# Patient Record
Sex: FEMALE | Race: OTHER | HISPANIC OR LATINO
[De-identification: names, ages, dates, MRNs, and addresses within clinical notes are randomized per-mention and may not be internally consistent; named-entity substitution may affect disease eponyms.]

---

## 2017-01-22 ENCOUNTER — TRANSCRIPTION ENCOUNTER (OUTPATIENT)
Age: 22
End: 2017-01-22

## 2017-05-11 ENCOUNTER — TRANSCRIPTION ENCOUNTER (OUTPATIENT)
Age: 22
End: 2017-05-11

## 2019-01-14 ENCOUNTER — TRANSCRIPTION ENCOUNTER (OUTPATIENT)
Age: 24
End: 2019-01-14

## 2020-01-17 ENCOUNTER — TRANSCRIPTION ENCOUNTER (OUTPATIENT)
Age: 25
End: 2020-01-17

## 2020-02-12 ENCOUNTER — TRANSCRIPTION ENCOUNTER (OUTPATIENT)
Age: 25
End: 2020-02-12

## 2021-04-06 ENCOUNTER — NON-APPOINTMENT (OUTPATIENT)
Age: 26
End: 2021-04-06

## 2021-04-06 ENCOUNTER — APPOINTMENT (OUTPATIENT)
Dept: OBGYN | Facility: CLINIC | Age: 26
End: 2021-04-06
Payer: COMMERCIAL

## 2021-04-06 ENCOUNTER — LABORATORY RESULT (OUTPATIENT)
Age: 26
End: 2021-04-06

## 2021-04-06 VITALS
WEIGHT: 114 LBS | BODY MASS INDEX: 24.59 KG/M2 | SYSTOLIC BLOOD PRESSURE: 90 MMHG | HEIGHT: 57 IN | DIASTOLIC BLOOD PRESSURE: 58 MMHG

## 2021-04-06 DIAGNOSIS — Z34.01 ENCOUNTER FOR SUPERVISION OF NORMAL FIRST PREGNANCY, FIRST TRIMESTER: ICD-10-CM

## 2021-04-06 PROCEDURE — 0500F INITIAL PRENATAL CARE VISIT: CPT

## 2021-04-07 LAB
ABO + RH PNL BLD: NORMAL
ALBUMIN SERPL ELPH-MCNC: 4.4 G/DL
ALP BLD-CCNC: 57 U/L
ALT SERPL-CCNC: 14 U/L
ANION GAP SERPL CALC-SCNC: 13 MMOL/L
AST SERPL-CCNC: 16 U/L
BASOPHILS # BLD AUTO: 0.04 K/UL
BASOPHILS NFR BLD AUTO: 0.4 %
BILIRUB SERPL-MCNC: 0.3 MG/DL
BUN SERPL-MCNC: 9 MG/DL
C TRACH RRNA SPEC QL NAA+PROBE: NOT DETECTED
CALCIUM SERPL-MCNC: 9.8 MG/DL
CHLORIDE SERPL-SCNC: 103 MMOL/L
CO2 SERPL-SCNC: 21 MMOL/L
CREAT SERPL-MCNC: 0.41 MG/DL
EOSINOPHIL # BLD AUTO: 0.26 K/UL
EOSINOPHIL NFR BLD AUTO: 2.7 %
GLUCOSE SERPL-MCNC: 75 MG/DL
HBV SURFACE AG SER QL: NONREACTIVE
HCT VFR BLD CALC: 39.2 %
HGB A MFR BLD: 97.5 %
HGB A2 MFR BLD: 2.5 %
HGB BLD-MCNC: 12.6 G/DL
HGB FRACT BLD-IMP: NORMAL
HPV HIGH+LOW RISK DNA PNL CVX: NOT DETECTED
IMM GRANULOCYTES NFR BLD AUTO: 0.3 %
LYMPHOCYTES # BLD AUTO: 1.7 K/UL
LYMPHOCYTES NFR BLD AUTO: 17.7 %
MAN DIFF?: NORMAL
MCHC RBC-ENTMCNC: 31.2 PG
MCHC RBC-ENTMCNC: 32.1 GM/DL
MCV RBC AUTO: 97 FL
MEV IGG FLD QL IA: 37 AU/ML
MEV IGG+IGM SER-IMP: POSITIVE
MONOCYTES # BLD AUTO: 0.68 K/UL
MONOCYTES NFR BLD AUTO: 7.1 %
N GONORRHOEA RRNA SPEC QL NAA+PROBE: NOT DETECTED
NEUTROPHILS # BLD AUTO: 6.87 K/UL
NEUTROPHILS NFR BLD AUTO: 71.8 %
PLATELET # BLD AUTO: 315 K/UL
POTASSIUM SERPL-SCNC: 3.8 MMOL/L
PROT SERPL-MCNC: 7 G/DL
RBC # BLD: 4.04 M/UL
RBC # FLD: 13.6 %
RUBV IGG FLD-ACNC: 1.3 INDEX
RUBV IGG SER-IMP: POSITIVE
SODIUM SERPL-SCNC: 137 MMOL/L
SOURCE TP AMPLIFICATION: NORMAL
T PALLIDUM AB SER QL IA: NEGATIVE
VZV AB TITR SER: NEGATIVE
VZV IGG SER IF-ACNC: 122.4 INDEX
WBC # FLD AUTO: 9.58 K/UL

## 2021-04-08 LAB
BACTERIA UR CULT: NORMAL
M TB IFN-G BLD-IMP: NEGATIVE
QUANTIFERON TB PLUS MITOGEN MINUS NIL: 7.31 IU/ML
QUANTIFERON TB PLUS NIL: 0.02 IU/ML
QUANTIFERON TB PLUS TB1 MINUS NIL: 0 IU/ML
QUANTIFERON TB PLUS TB2 MINUS NIL: 0 IU/ML

## 2021-04-11 LAB — CYTOLOGY CVX/VAG DOC THIN PREP: NORMAL

## 2021-05-04 ENCOUNTER — APPOINTMENT (OUTPATIENT)
Dept: ANTEPARTUM | Facility: CLINIC | Age: 26
End: 2021-05-04
Payer: COMMERCIAL

## 2021-05-04 ENCOUNTER — APPOINTMENT (OUTPATIENT)
Dept: OBGYN | Facility: CLINIC | Age: 26
End: 2021-05-04
Payer: COMMERCIAL

## 2021-05-04 ENCOUNTER — NON-APPOINTMENT (OUTPATIENT)
Age: 26
End: 2021-05-04

## 2021-05-04 ENCOUNTER — ASOB RESULT (OUTPATIENT)
Age: 26
End: 2021-05-04

## 2021-05-04 VITALS
HEIGHT: 57 IN | BODY MASS INDEX: 25.67 KG/M2 | HEART RATE: 87 BPM | DIASTOLIC BLOOD PRESSURE: 61 MMHG | WEIGHT: 119 LBS | SYSTOLIC BLOOD PRESSURE: 94 MMHG

## 2021-05-04 VITALS
BODY MASS INDEX: 25.67 KG/M2 | HEIGHT: 57 IN | WEIGHT: 119 LBS | SYSTOLIC BLOOD PRESSURE: 94 MMHG | DIASTOLIC BLOOD PRESSURE: 56 MMHG

## 2021-05-04 DIAGNOSIS — Z87.59 PERSONAL HISTORY OF OTHER COMPLICATIONS OF PREGNANCY, CHILDBIRTH AND THE PUERPERIUM: ICD-10-CM

## 2021-05-04 DIAGNOSIS — Z87.19 PERSONAL HISTORY OF OTHER COMPLICATIONS OF PREGNANCY, CHILDBIRTH AND THE PUERPERIUM: ICD-10-CM

## 2021-05-04 PROCEDURE — 99072 ADDL SUPL MATRL&STAF TM PHE: CPT

## 2021-05-04 PROCEDURE — 99205 OFFICE O/P NEW HI 60 MIN: CPT | Mod: 25

## 2021-05-04 PROCEDURE — 76813 OB US NUCHAL MEAS 1 GEST: CPT | Mod: 59

## 2021-05-04 PROCEDURE — 76801 OB US < 14 WKS SINGLE FETUS: CPT

## 2021-05-04 PROCEDURE — 0502F SUBSEQUENT PRENATAL CARE: CPT

## 2021-05-04 RX ORDER — PYRIDOXINE HCL (VITAMIN B6) 25 MG
25 TABLET ORAL EVERY 8 HOURS
Qty: 45 | Refills: 0 | Status: ACTIVE | COMMUNITY
Start: 2021-05-04 | End: 1900-01-01

## 2021-05-05 LAB
HCV AB SER QL: NONREACTIVE
HCV S/CO RATIO: 0.13 S/CO

## 2021-05-06 ENCOUNTER — APPOINTMENT (OUTPATIENT)
Dept: ANTEPARTUM | Facility: CLINIC | Age: 26
End: 2021-05-06

## 2021-05-06 ENCOUNTER — ASOB RESULT (OUTPATIENT)
Age: 26
End: 2021-05-06

## 2021-05-12 ENCOUNTER — TRANSCRIPTION ENCOUNTER (OUTPATIENT)
Age: 26
End: 2021-05-12

## 2021-05-13 ENCOUNTER — NON-APPOINTMENT (OUTPATIENT)
Age: 26
End: 2021-05-13

## 2021-05-18 LAB
CLARIM 15Q11.2: NORMAL
CLARIM 1P36: NORMAL
CLARIM 22Q11.2: NORMAL
CLARIM 4P-/WOLF-HIRSCHHORN: NORMAL
CLARIM 5P-/CRI DU CHAT: NORMAL
CLARIM ADDITIONAL INFO: NORMAL
CLARIM CHROMOSOME 13: NORMAL
CLARIM CHROMOSOME 18: NORMAL
CLARIM CHROMOSOME 21: NORMAL
CLARIM SEX CHROMOSOMES: NORMAL
CLARITEST NIPT W/MICRO: NORMAL

## 2021-06-01 ENCOUNTER — NON-APPOINTMENT (OUTPATIENT)
Age: 26
End: 2021-06-01

## 2021-06-01 ENCOUNTER — APPOINTMENT (OUTPATIENT)
Dept: OBGYN | Facility: CLINIC | Age: 26
End: 2021-06-01
Payer: COMMERCIAL

## 2021-06-01 VITALS
DIASTOLIC BLOOD PRESSURE: 69 MMHG | SYSTOLIC BLOOD PRESSURE: 106 MMHG | HEIGHT: 57 IN | WEIGHT: 120 LBS | BODY MASS INDEX: 25.89 KG/M2

## 2021-06-01 DIAGNOSIS — Z34.92 ENCOUNTER FOR SUPERVISION OF NORMAL PREGNANCY, UNSPECIFIED, SECOND TRIMESTER: ICD-10-CM

## 2021-06-01 PROCEDURE — 0502F SUBSEQUENT PRENATAL CARE: CPT | Mod: 25

## 2021-06-02 ENCOUNTER — ASOB RESULT (OUTPATIENT)
Age: 26
End: 2021-06-02

## 2021-06-02 ENCOUNTER — APPOINTMENT (OUTPATIENT)
Dept: ANTEPARTUM | Facility: CLINIC | Age: 26
End: 2021-06-02
Payer: COMMERCIAL

## 2021-06-02 PROCEDURE — 76805 OB US >/= 14 WKS SNGL FETUS: CPT

## 2021-06-02 PROCEDURE — 99072 ADDL SUPL MATRL&STAF TM PHE: CPT

## 2021-06-02 PROCEDURE — 76817 TRANSVAGINAL US OBSTETRIC: CPT

## 2021-06-04 LAB
2ND TRIMESTER DATA: NORMAL
AFP PNL SERPL: NORMAL
AFP SERPL-ACNC: NORMAL
CLINICAL BIOCHEMIST REVIEW: NORMAL
NOTES NTD: NORMAL

## 2021-06-17 ENCOUNTER — ASOB RESULT (OUTPATIENT)
Age: 26
End: 2021-06-17

## 2021-06-17 ENCOUNTER — APPOINTMENT (OUTPATIENT)
Dept: ANTEPARTUM | Facility: CLINIC | Age: 26
End: 2021-06-17
Payer: COMMERCIAL

## 2021-06-17 PROCEDURE — 76815 OB US LIMITED FETUS(S): CPT

## 2021-06-17 PROCEDURE — 99072 ADDL SUPL MATRL&STAF TM PHE: CPT

## 2021-06-17 PROCEDURE — 76817 TRANSVAGINAL US OBSTETRIC: CPT

## 2021-06-29 ENCOUNTER — ASOB RESULT (OUTPATIENT)
Age: 26
End: 2021-06-29

## 2021-06-29 ENCOUNTER — NON-APPOINTMENT (OUTPATIENT)
Age: 26
End: 2021-06-29

## 2021-06-29 ENCOUNTER — APPOINTMENT (OUTPATIENT)
Dept: ANTEPARTUM | Facility: CLINIC | Age: 26
End: 2021-06-29
Payer: COMMERCIAL

## 2021-06-29 ENCOUNTER — APPOINTMENT (OUTPATIENT)
Dept: OBGYN | Facility: CLINIC | Age: 26
End: 2021-06-29
Payer: COMMERCIAL

## 2021-06-29 VITALS
HEART RATE: 71 BPM | OXYGEN SATURATION: 97 % | WEIGHT: 122 LBS | BODY MASS INDEX: 26.32 KG/M2 | DIASTOLIC BLOOD PRESSURE: 52 MMHG | SYSTOLIC BLOOD PRESSURE: 78 MMHG | HEIGHT: 57 IN | TEMPERATURE: 97.9 F

## 2021-06-29 PROCEDURE — 76811 OB US DETAILED SNGL FETUS: CPT

## 2021-06-29 PROCEDURE — 0502F SUBSEQUENT PRENATAL CARE: CPT

## 2021-06-29 PROCEDURE — 99072 ADDL SUPL MATRL&STAF TM PHE: CPT

## 2021-07-27 ENCOUNTER — NON-APPOINTMENT (OUTPATIENT)
Age: 26
End: 2021-07-27

## 2021-07-27 ENCOUNTER — APPOINTMENT (OUTPATIENT)
Dept: OBGYN | Facility: CLINIC | Age: 26
End: 2021-07-27
Payer: COMMERCIAL

## 2021-07-27 VITALS
HEART RATE: 101 BPM | DIASTOLIC BLOOD PRESSURE: 62 MMHG | SYSTOLIC BLOOD PRESSURE: 100 MMHG | HEIGHT: 57 IN | BODY MASS INDEX: 27.18 KG/M2 | OXYGEN SATURATION: 98 % | TEMPERATURE: 97.3 F | WEIGHT: 126 LBS

## 2021-07-27 PROCEDURE — 0502F SUBSEQUENT PRENATAL CARE: CPT

## 2021-07-29 LAB
BASOPHILS # BLD AUTO: 0.03 K/UL
BASOPHILS NFR BLD AUTO: 0.3 %
EOSINOPHIL # BLD AUTO: 0.14 K/UL
EOSINOPHIL NFR BLD AUTO: 1.3 %
GLUCOSE 1H P 50 G GLC PO SERPL-MCNC: 80 MG/DL
HCT VFR BLD CALC: 35.6 %
HGB BLD-MCNC: 10.6 G/DL
IMM GRANULOCYTES NFR BLD AUTO: 1.6 %
LYMPHOCYTES # BLD AUTO: 1.41 K/UL
LYMPHOCYTES NFR BLD AUTO: 13 %
MAN DIFF?: NORMAL
MCHC RBC-ENTMCNC: 29.8 GM/DL
MCHC RBC-ENTMCNC: 30.2 PG
MCV RBC AUTO: 101.4 FL
MONOCYTES # BLD AUTO: 1 K/UL
MONOCYTES NFR BLD AUTO: 9.2 %
NEUTROPHILS # BLD AUTO: 8.13 K/UL
NEUTROPHILS NFR BLD AUTO: 74.6 %
PLATELET # BLD AUTO: 317 K/UL
RBC # BLD: 3.51 M/UL
RBC # FLD: 13.5 %
T PALLIDUM AB SER QL IA: NEGATIVE
WBC # FLD AUTO: 10.88 K/UL

## 2021-08-24 ENCOUNTER — APPOINTMENT (OUTPATIENT)
Dept: OBGYN | Facility: CLINIC | Age: 26
End: 2021-08-24
Payer: COMMERCIAL

## 2021-08-24 VITALS
TEMPERATURE: 97.2 F | DIASTOLIC BLOOD PRESSURE: 60 MMHG | HEART RATE: 104 BPM | HEIGHT: 57 IN | WEIGHT: 131 LBS | SYSTOLIC BLOOD PRESSURE: 89 MMHG | BODY MASS INDEX: 28.26 KG/M2 | OXYGEN SATURATION: 98 %

## 2021-08-24 PROCEDURE — 0502F SUBSEQUENT PRENATAL CARE: CPT

## 2021-08-24 RX ORDER — CHLORHEXIDINE GLUCONATE 4 %
325 (65 FE) LIQUID (ML) TOPICAL DAILY
Qty: 30 | Refills: 6 | Status: ACTIVE | COMMUNITY
Start: 2021-08-24 | End: 1900-01-01

## 2021-09-14 ENCOUNTER — APPOINTMENT (OUTPATIENT)
Dept: OBGYN | Facility: CLINIC | Age: 26
End: 2021-09-14

## 2021-09-14 ENCOUNTER — LABORATORY RESULT (OUTPATIENT)
Age: 26
End: 2021-09-14

## 2021-09-14 VITALS
DIASTOLIC BLOOD PRESSURE: 57 MMHG | BODY MASS INDEX: 28.26 KG/M2 | TEMPERATURE: 97.7 F | OXYGEN SATURATION: 98 % | HEART RATE: 86 BPM | SYSTOLIC BLOOD PRESSURE: 94 MMHG | WEIGHT: 131 LBS | HEIGHT: 57 IN

## 2021-09-14 DIAGNOSIS — Z34.93 ENCOUNTER FOR SUPERVISION OF NORMAL PREGNANCY, UNSPECIFIED, THIRD TRIMESTER: ICD-10-CM

## 2021-09-15 LAB
BASOPHILS # BLD AUTO: 0.03 K/UL
BASOPHILS NFR BLD AUTO: 0.3 %
EOSINOPHIL # BLD AUTO: 0.15 K/UL
EOSINOPHIL NFR BLD AUTO: 1.6 %
HCT VFR BLD CALC: 36.6 %
HGB BLD-MCNC: 10.6 G/DL
IMM GRANULOCYTES NFR BLD AUTO: 1.5 %
LYMPHOCYTES # BLD AUTO: 1.43 K/UL
LYMPHOCYTES NFR BLD AUTO: 15.6 %
MAN DIFF?: NORMAL
MCHC RBC-ENTMCNC: 28.1 PG
MCHC RBC-ENTMCNC: 29 GM/DL
MCV RBC AUTO: 97.1 FL
MONOCYTES # BLD AUTO: 0.77 K/UL
MONOCYTES NFR BLD AUTO: 8.4 %
NEUTROPHILS # BLD AUTO: 6.66 K/UL
NEUTROPHILS NFR BLD AUTO: 72.6 %
PLATELET # BLD AUTO: 282 K/UL
RBC # BLD: 3.77 M/UL
RBC # FLD: 17.4 %
WBC # FLD AUTO: 9.18 K/UL

## 2021-09-21 ENCOUNTER — APPOINTMENT (OUTPATIENT)
Dept: ANTEPARTUM | Facility: CLINIC | Age: 26
End: 2021-09-21
Payer: COMMERCIAL

## 2021-09-21 ENCOUNTER — ASOB RESULT (OUTPATIENT)
Age: 26
End: 2021-09-21

## 2021-09-21 PROCEDURE — 76816 OB US FOLLOW-UP PER FETUS: CPT

## 2021-09-28 ENCOUNTER — APPOINTMENT (OUTPATIENT)
Dept: OBGYN | Facility: CLINIC | Age: 26
End: 2021-09-28
Payer: COMMERCIAL

## 2021-09-28 VITALS
HEART RATE: 82 BPM | HEIGHT: 57 IN | SYSTOLIC BLOOD PRESSURE: 89 MMHG | WEIGHT: 132 LBS | BODY MASS INDEX: 28.48 KG/M2 | OXYGEN SATURATION: 98 % | DIASTOLIC BLOOD PRESSURE: 54 MMHG | TEMPERATURE: 97.7 F

## 2021-09-28 PROCEDURE — 0502F SUBSEQUENT PRENATAL CARE: CPT

## 2021-10-12 ENCOUNTER — APPOINTMENT (OUTPATIENT)
Dept: OBGYN | Facility: CLINIC | Age: 26
End: 2021-10-12
Payer: COMMERCIAL

## 2021-10-12 ENCOUNTER — LABORATORY RESULT (OUTPATIENT)
Age: 26
End: 2021-10-12

## 2021-10-12 VITALS
OXYGEN SATURATION: 98 % | SYSTOLIC BLOOD PRESSURE: 97 MMHG | TEMPERATURE: 97.5 F | WEIGHT: 134 LBS | DIASTOLIC BLOOD PRESSURE: 57 MMHG | HEIGHT: 57 IN | HEART RATE: 68 BPM | BODY MASS INDEX: 28.91 KG/M2

## 2021-10-12 PROCEDURE — 0502F SUBSEQUENT PRENATAL CARE: CPT

## 2021-10-19 ENCOUNTER — APPOINTMENT (OUTPATIENT)
Dept: ANTEPARTUM | Facility: CLINIC | Age: 26
End: 2021-10-19
Payer: COMMERCIAL

## 2021-10-19 ENCOUNTER — ASOB RESULT (OUTPATIENT)
Age: 26
End: 2021-10-19

## 2021-10-19 ENCOUNTER — APPOINTMENT (OUTPATIENT)
Dept: OBGYN | Facility: CLINIC | Age: 26
End: 2021-10-19

## 2021-10-19 VITALS
HEIGHT: 57 IN | TEMPERATURE: 97.7 F | OXYGEN SATURATION: 98 % | BODY MASS INDEX: 29.34 KG/M2 | DIASTOLIC BLOOD PRESSURE: 58 MMHG | WEIGHT: 136 LBS | SYSTOLIC BLOOD PRESSURE: 99 MMHG

## 2021-10-19 PROCEDURE — 76816 OB US FOLLOW-UP PER FETUS: CPT

## 2021-10-26 ENCOUNTER — APPOINTMENT (OUTPATIENT)
Dept: OBGYN | Facility: CLINIC | Age: 26
End: 2021-10-26
Payer: COMMERCIAL

## 2021-10-26 VITALS
WEIGHT: 136 LBS | TEMPERATURE: 97.9 F | DIASTOLIC BLOOD PRESSURE: 60 MMHG | BODY MASS INDEX: 29.34 KG/M2 | OXYGEN SATURATION: 98 % | HEART RATE: 68 BPM | SYSTOLIC BLOOD PRESSURE: 95 MMHG | HEIGHT: 57 IN

## 2021-10-26 PROCEDURE — 0502F SUBSEQUENT PRENATAL CARE: CPT

## 2021-11-01 ENCOUNTER — APPOINTMENT (OUTPATIENT)
Dept: OBGYN | Facility: CLINIC | Age: 26
End: 2021-11-01
Payer: COMMERCIAL

## 2021-11-01 VITALS
HEIGHT: 57 IN | OXYGEN SATURATION: 98 % | DIASTOLIC BLOOD PRESSURE: 52 MMHG | TEMPERATURE: 97.5 F | WEIGHT: 140 LBS | BODY MASS INDEX: 30.2 KG/M2 | SYSTOLIC BLOOD PRESSURE: 92 MMHG | HEART RATE: 94 BPM

## 2021-11-01 VITALS
DIASTOLIC BLOOD PRESSURE: 52 MMHG | TEMPERATURE: 97.5 F | SYSTOLIC BLOOD PRESSURE: 92 MMHG | HEIGHT: 57 IN | BODY MASS INDEX: 30.2 KG/M2 | HEART RATE: 94 BPM | WEIGHT: 140 LBS | OXYGEN SATURATION: 98 %

## 2021-11-01 PROCEDURE — 0502F SUBSEQUENT PRENATAL CARE: CPT

## 2021-11-04 ENCOUNTER — INPATIENT (INPATIENT)
Facility: HOSPITAL | Age: 26
LOS: 1 days | Discharge: HOME | End: 2021-11-06
Attending: OBSTETRICS & GYNECOLOGY | Admitting: OBSTETRICS & GYNECOLOGY
Payer: COMMERCIAL

## 2021-11-04 VITALS — WEIGHT: 136.91 LBS | HEIGHT: 56 IN

## 2021-11-04 DIAGNOSIS — Z34.80 ENCOUNTER FOR SUPERVISION OF OTHER NORMAL PREGNANCY, UNSPECIFIED TRIMESTER: ICD-10-CM

## 2021-11-04 DIAGNOSIS — O26.899 OTHER SPECIFIED PREGNANCY RELATED CONDITIONS, UNSPECIFIED TRIMESTER: ICD-10-CM

## 2021-11-04 DIAGNOSIS — Z3A.00 WEEKS OF GESTATION OF PREGNANCY NOT SPECIFIED: ICD-10-CM

## 2021-11-04 LAB
APTT BLD: 26.2 SEC — LOW (ref 27.5–35.5)
BASOPHILS # BLD AUTO: 0.01 K/UL — SIGNIFICANT CHANGE UP (ref 0–0.2)
BASOPHILS NFR BLD AUTO: 0.1 % — SIGNIFICANT CHANGE UP (ref 0–2)
BLD GP AB SCN SERPL QL: SIGNIFICANT CHANGE UP
EOSINOPHIL # BLD AUTO: 0.03 K/UL — SIGNIFICANT CHANGE UP (ref 0–0.5)
EOSINOPHIL NFR BLD AUTO: 0.4 % — SIGNIFICANT CHANGE UP (ref 0–6)
FIBRINOGEN PPP-MCNC: 667 MG/DL — HIGH (ref 290–520)
HCT VFR BLD CALC: 37.1 % — SIGNIFICANT CHANGE UP (ref 34.5–45)
HGB BLD-MCNC: 12 G/DL — SIGNIFICANT CHANGE UP (ref 11.5–15.5)
IMM GRANULOCYTES NFR BLD AUTO: 0.4 % — SIGNIFICANT CHANGE UP (ref 0–1.5)
INR BLD: 0.99 RATIO — SIGNIFICANT CHANGE UP (ref 0.88–1.16)
LYMPHOCYTES # BLD AUTO: 1.22 K/UL — SIGNIFICANT CHANGE UP (ref 1–3.3)
LYMPHOCYTES # BLD AUTO: 14.5 % — SIGNIFICANT CHANGE UP (ref 13–44)
MCHC RBC-ENTMCNC: 27.7 PG — SIGNIFICANT CHANGE UP (ref 27–34)
MCHC RBC-ENTMCNC: 32.3 GM/DL — SIGNIFICANT CHANGE UP (ref 32–36)
MCV RBC AUTO: 85.7 FL — SIGNIFICANT CHANGE UP (ref 80–100)
MONOCYTES # BLD AUTO: 0.45 K/UL — SIGNIFICANT CHANGE UP (ref 0–0.9)
MONOCYTES NFR BLD AUTO: 5.3 % — SIGNIFICANT CHANGE UP (ref 2–14)
NEUTROPHILS # BLD AUTO: 6.69 K/UL — SIGNIFICANT CHANGE UP (ref 1.8–7.4)
NEUTROPHILS NFR BLD AUTO: 79.3 % — HIGH (ref 43–77)
NRBC # BLD: 0 /100 WBCS — SIGNIFICANT CHANGE UP (ref 0–0)
PLATELET # BLD AUTO: 293 K/UL — SIGNIFICANT CHANGE UP (ref 150–400)
PROTHROM AB SERPL-ACNC: 11.8 SEC — SIGNIFICANT CHANGE UP (ref 10.6–13.6)
RBC # BLD: 4.33 M/UL — SIGNIFICANT CHANGE UP (ref 3.8–5.2)
RBC # FLD: 17.6 % — HIGH (ref 10.3–14.5)
SARS-COV-2 RNA SPEC QL NAA+PROBE: SIGNIFICANT CHANGE UP
WBC # BLD: 8.43 K/UL — SIGNIFICANT CHANGE UP (ref 3.8–10.5)
WBC # FLD AUTO: 8.43 K/UL — SIGNIFICANT CHANGE UP (ref 3.8–10.5)

## 2021-11-04 PROCEDURE — 59400 OBSTETRICAL CARE: CPT | Mod: U9

## 2021-11-04 RX ORDER — PRAMOXINE HYDROCHLORIDE 150 MG/15G
1 AEROSOL, FOAM RECTAL EVERY 4 HOURS
Refills: 0 | Status: DISCONTINUED | OUTPATIENT
Start: 2021-11-04 | End: 2021-11-06

## 2021-11-04 RX ORDER — OXYCODONE HYDROCHLORIDE 5 MG/1
5 TABLET ORAL EVERY 6 HOURS
Refills: 0 | Status: DISCONTINUED | OUTPATIENT
Start: 2021-11-04 | End: 2021-11-06

## 2021-11-04 RX ORDER — DIBUCAINE 1 %
1 OINTMENT (GRAM) RECTAL EVERY 6 HOURS
Refills: 0 | Status: DISCONTINUED | OUTPATIENT
Start: 2021-11-04 | End: 2021-11-06

## 2021-11-04 RX ORDER — CITRIC ACID/SODIUM CITRATE 300-500 MG
15 SOLUTION, ORAL ORAL EVERY 6 HOURS
Refills: 0 | Status: DISCONTINUED | OUTPATIENT
Start: 2021-11-04 | End: 2021-11-04

## 2021-11-04 RX ORDER — BENZOCAINE 10 %
1 GEL (GRAM) MUCOUS MEMBRANE EVERY 6 HOURS
Refills: 0 | Status: DISCONTINUED | OUTPATIENT
Start: 2021-11-04 | End: 2021-11-06

## 2021-11-04 RX ORDER — SODIUM CHLORIDE 9 MG/ML
3 INJECTION INTRAMUSCULAR; INTRAVENOUS; SUBCUTANEOUS EVERY 8 HOURS
Refills: 0 | Status: DISCONTINUED | OUTPATIENT
Start: 2021-11-04 | End: 2021-11-06

## 2021-11-04 RX ORDER — SIMETHICONE 80 MG/1
80 TABLET, CHEWABLE ORAL EVERY 4 HOURS
Refills: 0 | Status: DISCONTINUED | OUTPATIENT
Start: 2021-11-04 | End: 2021-11-06

## 2021-11-04 RX ORDER — LANOLIN
1 OINTMENT (GRAM) TOPICAL EVERY 6 HOURS
Refills: 0 | Status: DISCONTINUED | OUTPATIENT
Start: 2021-11-04 | End: 2021-11-06

## 2021-11-04 RX ORDER — INFLUENZA VIRUS VACCINE 15; 15; 15; 15 UG/.5ML; UG/.5ML; UG/.5ML; UG/.5ML
0.5 SUSPENSION INTRAMUSCULAR ONCE
Refills: 0 | Status: DISCONTINUED | OUTPATIENT
Start: 2021-11-04 | End: 2021-11-06

## 2021-11-04 RX ORDER — IBUPROFEN 200 MG
600 TABLET ORAL EVERY 6 HOURS
Refills: 0 | Status: DISCONTINUED | OUTPATIENT
Start: 2021-11-04 | End: 2021-11-06

## 2021-11-04 RX ORDER — OXYTOCIN 10 UNIT/ML
333.33 VIAL (ML) INJECTION
Qty: 20 | Refills: 0 | Status: DISCONTINUED | OUTPATIENT
Start: 2021-11-04 | End: 2021-11-06

## 2021-11-04 RX ORDER — AER TRAVELER 0.5 G/1
1 SOLUTION RECTAL; TOPICAL EVERY 4 HOURS
Refills: 0 | Status: DISCONTINUED | OUTPATIENT
Start: 2021-11-04 | End: 2021-11-06

## 2021-11-04 RX ORDER — MAGNESIUM HYDROXIDE 400 MG/1
30 TABLET, CHEWABLE ORAL
Refills: 0 | Status: DISCONTINUED | OUTPATIENT
Start: 2021-11-04 | End: 2021-11-06

## 2021-11-04 RX ORDER — TETANUS TOXOID, REDUCED DIPHTHERIA TOXOID AND ACELLULAR PERTUSSIS VACCINE, ADSORBED 5; 2.5; 8; 8; 2.5 [IU]/.5ML; [IU]/.5ML; UG/.5ML; UG/.5ML; UG/.5ML
0.5 SUSPENSION INTRAMUSCULAR ONCE
Refills: 0 | Status: DISCONTINUED | OUTPATIENT
Start: 2021-11-04 | End: 2021-11-06

## 2021-11-04 RX ORDER — OXYTOCIN 10 UNIT/ML
VIAL (ML) INJECTION
Qty: 30 | Refills: 0 | Status: DISCONTINUED | OUTPATIENT
Start: 2021-11-04 | End: 2021-11-04

## 2021-11-04 RX ORDER — SODIUM CHLORIDE 9 MG/ML
1000 INJECTION, SOLUTION INTRAVENOUS ONCE
Refills: 0 | Status: DISCONTINUED | OUTPATIENT
Start: 2021-11-04 | End: 2021-11-04

## 2021-11-04 RX ORDER — ACETAMINOPHEN 500 MG
650 TABLET ORAL EVERY 6 HOURS
Refills: 0 | Status: DISCONTINUED | OUTPATIENT
Start: 2021-11-04 | End: 2021-11-06

## 2021-11-04 RX ORDER — HYDROCORTISONE 1 %
1 OINTMENT (GRAM) TOPICAL EVERY 6 HOURS
Refills: 0 | Status: DISCONTINUED | OUTPATIENT
Start: 2021-11-04 | End: 2021-11-06

## 2021-11-04 RX ORDER — SODIUM CHLORIDE 9 MG/ML
1000 INJECTION, SOLUTION INTRAVENOUS
Refills: 0 | Status: DISCONTINUED | OUTPATIENT
Start: 2021-11-04 | End: 2021-11-04

## 2021-11-04 RX ORDER — DIPHENHYDRAMINE HCL 50 MG
25 CAPSULE ORAL EVERY 6 HOURS
Refills: 0 | Status: DISCONTINUED | OUTPATIENT
Start: 2021-11-04 | End: 2021-11-06

## 2021-11-04 RX ADMIN — Medication 1000 MILLIUNIT(S)/MIN: at 22:18

## 2021-11-04 RX ADMIN — Medication 6 MILLIUNIT(S)/MIN: at 18:13

## 2021-11-04 RX ADMIN — SODIUM CHLORIDE 125 MILLILITER(S): 9 INJECTION, SOLUTION INTRAVENOUS at 17:03

## 2021-11-05 LAB
BASOPHILS # BLD AUTO: 0.03 K/UL — SIGNIFICANT CHANGE UP (ref 0–0.2)
BASOPHILS NFR BLD AUTO: 0.2 % — SIGNIFICANT CHANGE UP (ref 0–2)
EOSINOPHIL # BLD AUTO: 0.06 K/UL — SIGNIFICANT CHANGE UP (ref 0–0.5)
EOSINOPHIL NFR BLD AUTO: 0.4 % — SIGNIFICANT CHANGE UP (ref 0–6)
HCT VFR BLD CALC: 28.5 % — LOW (ref 34.5–45)
HGB BLD-MCNC: 9.2 G/DL — LOW (ref 11.5–15.5)
IMM GRANULOCYTES NFR BLD AUTO: 0.4 % — SIGNIFICANT CHANGE UP (ref 0–1.5)
LYMPHOCYTES # BLD AUTO: 14.5 % — SIGNIFICANT CHANGE UP (ref 13–44)
LYMPHOCYTES # BLD AUTO: 2.11 K/UL — SIGNIFICANT CHANGE UP (ref 1–3.3)
MCHC RBC-ENTMCNC: 27.8 PG — SIGNIFICANT CHANGE UP (ref 27–34)
MCHC RBC-ENTMCNC: 32.3 GM/DL — SIGNIFICANT CHANGE UP (ref 32–36)
MCV RBC AUTO: 86.1 FL — SIGNIFICANT CHANGE UP (ref 80–100)
MONOCYTES # BLD AUTO: 1.03 K/UL — HIGH (ref 0–0.9)
MONOCYTES NFR BLD AUTO: 7.1 % — SIGNIFICANT CHANGE UP (ref 2–14)
NEUTROPHILS # BLD AUTO: 11.23 K/UL — HIGH (ref 1.8–7.4)
NEUTROPHILS NFR BLD AUTO: 77.4 % — HIGH (ref 43–77)
NRBC # BLD: 0 /100 WBCS — SIGNIFICANT CHANGE UP (ref 0–0)
PLATELET # BLD AUTO: 226 K/UL — SIGNIFICANT CHANGE UP (ref 150–400)
RBC # BLD: 3.31 M/UL — LOW (ref 3.8–5.2)
RBC # FLD: 17.8 % — HIGH (ref 10.3–14.5)
T PALLIDUM AB TITR SER: NEGATIVE — SIGNIFICANT CHANGE UP
WBC # BLD: 14.52 K/UL — HIGH (ref 3.8–10.5)
WBC # FLD AUTO: 14.52 K/UL — HIGH (ref 3.8–10.5)

## 2021-11-05 RX ADMIN — Medication 600 MILLIGRAM(S): at 12:07

## 2021-11-05 RX ADMIN — SODIUM CHLORIDE 3 MILLILITER(S): 9 INJECTION INTRAMUSCULAR; INTRAVENOUS; SUBCUTANEOUS at 14:08

## 2021-11-05 RX ADMIN — Medication 600 MILLIGRAM(S): at 11:53

## 2021-11-05 RX ADMIN — Medication 600 MILLIGRAM(S): at 18:26

## 2021-11-05 RX ADMIN — Medication 600 MILLIGRAM(S): at 18:04

## 2021-11-05 RX ADMIN — Medication 1 TABLET(S): at 11:53

## 2021-11-05 RX ADMIN — SODIUM CHLORIDE 3 MILLILITER(S): 9 INJECTION INTRAMUSCULAR; INTRAVENOUS; SUBCUTANEOUS at 06:21

## 2021-11-05 NOTE — LACTATION INITIAL EVALUATION - LACTATION INTERVENTIONS
Breastfeeding on cue 8-12X/24 hours with diaper count to assess for adequate intake, safe skin to skin and rooming-in encouraged./reviewed components of an effective feeding and at least 8 effective feedings per day required/reviewed importance of monitoring infant diapers, the breastfeeding log, and minimum output each day/reviewed risks of unnecessary formula supplementation/reviewed benefits and recommendations for rooming in/reviewed feeding on demand/by cue at least 8 times a day

## 2021-11-06 ENCOUNTER — TRANSCRIPTION ENCOUNTER (OUTPATIENT)
Age: 26
End: 2021-11-06

## 2021-11-06 VITALS
HEART RATE: 75 BPM | TEMPERATURE: 98 F | SYSTOLIC BLOOD PRESSURE: 90 MMHG | OXYGEN SATURATION: 99 % | DIASTOLIC BLOOD PRESSURE: 58 MMHG | RESPIRATION RATE: 18 BRPM

## 2021-11-06 DIAGNOSIS — D62 ACUTE POSTHEMORRHAGIC ANEMIA: ICD-10-CM

## 2021-11-06 LAB
BASOPHILS # BLD AUTO: 0.01 K/UL — SIGNIFICANT CHANGE UP (ref 0–0.2)
BASOPHILS NFR BLD AUTO: 0.1 % — SIGNIFICANT CHANGE UP (ref 0–2)
EOSINOPHIL # BLD AUTO: 0.18 K/UL — SIGNIFICANT CHANGE UP (ref 0–0.5)
EOSINOPHIL NFR BLD AUTO: 1.5 % — SIGNIFICANT CHANGE UP (ref 0–6)
HCT VFR BLD CALC: 27.6 % — LOW (ref 34.5–45)
HGB BLD-MCNC: 8.8 G/DL — LOW (ref 11.5–15.5)
IMM GRANULOCYTES NFR BLD AUTO: 0.7 % — SIGNIFICANT CHANGE UP (ref 0–1.5)
LYMPHOCYTES # BLD AUTO: 2.73 K/UL — SIGNIFICANT CHANGE UP (ref 1–3.3)
LYMPHOCYTES # BLD AUTO: 22.8 % — SIGNIFICANT CHANGE UP (ref 13–44)
MCHC RBC-ENTMCNC: 27.6 PG — SIGNIFICANT CHANGE UP (ref 27–34)
MCHC RBC-ENTMCNC: 31.9 GM/DL — LOW (ref 32–36)
MCV RBC AUTO: 86.5 FL — SIGNIFICANT CHANGE UP (ref 80–100)
MONOCYTES # BLD AUTO: 0.86 K/UL — SIGNIFICANT CHANGE UP (ref 0–0.9)
MONOCYTES NFR BLD AUTO: 7.2 % — SIGNIFICANT CHANGE UP (ref 2–14)
NEUTROPHILS # BLD AUTO: 8.13 K/UL — HIGH (ref 1.8–7.4)
NEUTROPHILS NFR BLD AUTO: 67.7 % — SIGNIFICANT CHANGE UP (ref 43–77)
NRBC # BLD: 0 /100 WBCS — SIGNIFICANT CHANGE UP (ref 0–0)
PLATELET # BLD AUTO: 235 K/UL — SIGNIFICANT CHANGE UP (ref 150–400)
RBC # BLD: 3.19 M/UL — LOW (ref 3.8–5.2)
RBC # FLD: 17.8 % — HIGH (ref 10.3–14.5)
WBC # BLD: 11.99 K/UL — HIGH (ref 3.8–10.5)
WBC # FLD AUTO: 11.99 K/UL — HIGH (ref 3.8–10.5)

## 2021-11-06 PROCEDURE — 59050 FETAL MONITOR W/REPORT: CPT

## 2021-11-06 PROCEDURE — 59025 FETAL NON-STRESS TEST: CPT

## 2021-11-06 PROCEDURE — 86901 BLOOD TYPING SEROLOGIC RH(D): CPT

## 2021-11-06 PROCEDURE — 86900 BLOOD TYPING SEROLOGIC ABO: CPT

## 2021-11-06 PROCEDURE — 86850 RBC ANTIBODY SCREEN: CPT

## 2021-11-06 PROCEDURE — 85610 PROTHROMBIN TIME: CPT

## 2021-11-06 PROCEDURE — 85730 THROMBOPLASTIN TIME PARTIAL: CPT

## 2021-11-06 PROCEDURE — 86780 TREPONEMA PALLIDUM: CPT

## 2021-11-06 PROCEDURE — G0463: CPT

## 2021-11-06 PROCEDURE — 85025 COMPLETE CBC W/AUTO DIFF WBC: CPT

## 2021-11-06 PROCEDURE — 36415 COLL VENOUS BLD VENIPUNCTURE: CPT

## 2021-11-06 PROCEDURE — 87635 SARS-COV-2 COVID-19 AMP PRB: CPT

## 2021-11-06 PROCEDURE — 85384 FIBRINOGEN ACTIVITY: CPT

## 2021-11-06 RX ORDER — OMEPRAZOLE 10 MG/1
1 CAPSULE, DELAYED RELEASE ORAL
Qty: 0 | Refills: 0 | DISCHARGE
Start: 2021-11-06

## 2021-11-06 RX ORDER — FERROUS SULFATE 325(65) MG
325 TABLET ORAL THREE TIMES A DAY
Refills: 0 | Status: DISCONTINUED | OUTPATIENT
Start: 2021-11-06 | End: 2021-11-06

## 2021-11-06 RX ORDER — FERROUS SULFATE 325(65) MG
1 TABLET ORAL
Qty: 30 | Refills: 0
Start: 2021-11-06 | End: 2021-12-05

## 2021-11-06 RX ORDER — SIMETHICONE 80 MG/1
1 TABLET, CHEWABLE ORAL
Qty: 60 | Refills: 0
Start: 2021-11-06 | End: 2021-11-15

## 2021-11-06 RX ORDER — IBUPROFEN 200 MG
1 TABLET ORAL
Qty: 20 | Refills: 2
Start: 2021-11-06 | End: 2021-11-20

## 2021-11-06 RX ORDER — SENNOSIDES/DOCUSATE SODIUM 8.6MG-50MG
2 TABLET ORAL
Qty: 60 | Refills: 0
Start: 2021-11-06 | End: 2021-12-05

## 2021-11-06 RX ADMIN — Medication 1 TABLET(S): at 13:14

## 2021-11-06 RX ADMIN — Medication 325 MILLIGRAM(S): at 13:14

## 2021-11-06 RX ADMIN — Medication 600 MILLIGRAM(S): at 13:15

## 2021-11-06 RX ADMIN — Medication 325 MILLIGRAM(S): at 08:13

## 2021-11-06 RX ADMIN — Medication 600 MILLIGRAM(S): at 13:30

## 2021-11-06 NOTE — DISCHARGE NOTE OB - CARE PLAN
Principal Discharge DX:	 (normal spontaneous vaginal delivery)  Assessment and plan of treatment:	routine vaginal delivery care, no tub baths, douching or sex for 6weeks, ambulate daily   follow up in 5-6wks with own obgyn  Secondary Diagnosis:	Anemia due to acute blood loss  Assessment and plan of treatment:	take iron, folic acid, vitamin C, and prenatal vitamins. eat iron fortified food   1

## 2021-11-06 NOTE — CHART NOTE - NSCHARTNOTEFT_GEN_A_CORE
On Call SW consult requested due to hx  demise. Pt is 26 yrs old S/P  of a baby boy.  SW met with pt at bedside. also present at bedside was FOB and the . Pt consented to interview with FOB present. Pt presented Euthymic with congruent affect. This is the 3rd child born to this couple. Their 1st daughter, Dorinda,   at 6 weeks of age due to complications during surgery for Gastroschisis.  Their 2nd child is a healthy 6 yr old girl, Maeve. Pt reported that a few months after her 2nd daughter's birth she began to experience anxiety and panic related to her daughter's health.  She stated that just  one episode of vomiting or constipation would cause severe panic thus causing her take her daughter to the Pediatrician several times a week. Pt saw a therapist after the death of her 1st child for grief counseling and then resumed therapy when she began experiencing anxiety and panic about her living daughter's health. Pt understands that her panic and anxiety was related to the traumatic experience she had with her 1st child. Pt. reported that she continued counseling for a year and learned several coping skills which she finds very helpful. Pt denies experiencing any anxiety or panic attacks since then but she remains aware that it can happen again with her  son. Pt stated that she has the contact information for her therapist and she plans on calling her if she begins to experience any signs of anxiety, depression and or panic attacks now that her son was born. Pt reports having a large extended support system including her mother, mother in law, cousins and friends.    Pt reports having all the necessities at home to care for the  including a crib, clothing and diapers. Pt is breastfeeding. FOB reported that the infant car seat is already installed in the car which he will bring to the hospital upon DC.    Mother and Baby are Socially Cleared for DC.

## 2021-11-06 NOTE — DISCHARGE NOTE OB - CARE PROVIDER_API CALL
Ev Polanco)  OBGYN at [address], [address]  [place] [id number]  Phone: [phone]  Fax: [phone]  Established Patient  Follow Up Time: Routine

## 2021-11-06 NOTE — PROGRESS NOTE ADULT - SUBJECTIVE AND OBJECTIVE BOX
OBGYN PA NOTE PPD1  Patient seen and evaluated at bedside,  resting comfortably w/o any acute  complaints.  Denies fever, HA, CP, SOB, N/V/D, dizziness, palpitations, worsening abdominal pain, worsening vaginal bleeding, or any other concerns.  Ambulating and voiding without difficulty.  Passing flatus and tolerating regular diet.  Attempting to breastfeed.     Vital Signs Last 24 Hrs  T(C): 37.3 (05 Nov 2021 04:00), Max: 37.3 (05 Nov 2021 04:00)  T(F): 99.1 (05 Nov 2021 04:00), Max: 99.1 (05 Nov 2021 04:00)  HR: 98 (05 Nov 2021 04:00) (88 - 120)  BP: 96/58 (05 Nov 2021 04:00) (90/55 - 98/50)  BP(mean): 71 (05 Nov 2021 00:06) (71 - 71)  RR: 18 (05 Nov 2021 04:00) (16 - 18)  SpO2: 97% (05 Nov 2021 04:00) (97% - 99%)    Physical Exam:     Gen: A&Ox 3, NAD  Chest: CTAB/L  Cardiac: S1+S2+ RRR  Breast: Soft, nontender, nonengorged  Abdomen: Soft, nontender, Fundus firm below umbilicus, +BS  Gyn: Mild lochia, intact  Extremities: Nontender, DTRS 2+, no worsening edema                          12.0   8.43  )-----------( 293      ( 04 Nov 2021 16:58 )             37.1       
Patient seen at bedside resting comfortably offers no current complaints. Ambulating and voiding without difficulty. Passing flatus and tolerating regular diet.  both breast/bottle feeding.  Denies HA, CP, SOB, N/V/D, dizziness, palpitations, worsening abdominal pain, worsening vaginal bleeding, or any other concerns.      Vital Signs Last 24 Hrs  T(C): 36.8 (06 Nov 2021 05:54), Max: 36.9 (05 Nov 2021 18:46)  T(F): 98.3 (06 Nov 2021 05:54), Max: 98.4 (05 Nov 2021 18:46)  HR: 75 (06 Nov 2021 05:54) (75 - 92)  BP: 90/58 (06 Nov 2021 05:54) (87/56 - 95/61)  RR: 18 (06 Nov 2021 05:54) (16 - 18)  SpO2: 99% (06 Nov 2021 05:54) (97% - 99%)    Physical Exam:     Gen: A&Ox 3, NAD  Chest: CTA B/L  Cardiac: S1,S2; RRR  Breast: Soft, nontender, nonengorged  Abdomen: +BS; Soft, nontender, ND; Fundus firm below umbilicus  Gyn: Min lochia  Ext: Nontender, no worsening edema                          8.8    11.99 )-----------( 235      ( 06 Nov 2021 06:44 )             27.6

## 2021-11-06 NOTE — PROGRESS NOTE ADULT - ASSESSMENT
A/P:  26y F Postpartum day #1 s/p  @ 39 weeks, currently in stable condition  - f/u cbc  - hx of anxiety and  death ( P1), f/u social work consult  -Continue pain management  -Encourage OOB and ambulation  -Continue post partum care  -Plan for discharge tomorrow.   Dr Polanco aware
  PPD#2 s/p  @39wks, anemia

## 2021-11-06 NOTE — DISCHARGE NOTE OB - MEDICATION SUMMARY - MEDICATIONS TO TAKE
I will START or STAY ON the medications listed below when I get home from the hospital:    ibuprofen 600 mg oral tablet  -- 1 tab(s) by mouth every 6 hours, As needed, Moderate Pain (4 - 6)  -- Indication: For for moderate pain    FeroSul 325 mg (65 mg elemental iron) oral tablet  -- 1 tab(s) by mouth once a day   -- Check with your doctor before becoming pregnant.  Do not chew, break, or crush.  May discolor urine or feces.    -- Indication: For anemia    Prenatal Multivitamins with Folic Acid 1 mg oral tablet  -- 1 tab(s) by mouth once a day  -- Indication: For Supplement    Nilda-Colace 50 mg-8.6 mg oral tablet  -- 2 tab(s) by mouth once a day (at bedtime)   -- Medication should be taken with plenty of water.    -- Indication: For for bowel movement    simethicone 80 mg oral tablet, chewable  -- 1 tab(s) by mouth every 4 hours  -- Indication: For for gas

## 2021-11-06 NOTE — PROGRESS NOTE ADULT - PROBLEM SELECTOR PLAN 1
-Discharge home with instructions  -Follow up in office in 5-6 weeks for postpartum visit  -Breastfeeding encouraged

## 2021-11-06 NOTE — DISCHARGE NOTE OB - PATIENT PORTAL LINK FT
You can access the FollowMyHealth Patient Portal offered by [facility] by registering at the following website: [email or web address]. By joining [facility]’s FollowMyHealth portal, you will also be able to view your health information using other applications (apps) compatible with our system.

## 2021-11-09 ENCOUNTER — APPOINTMENT (OUTPATIENT)
Dept: OBGYN | Facility: CLINIC | Age: 26
End: 2021-11-09

## 2023-11-08 NOTE — DISCHARGE NOTE OB - AVOID TUB BATHING UNTIL YOUR POSTPARTUM VISIT

## 2024-01-09 ENCOUNTER — APPOINTMENT (OUTPATIENT)
Dept: OBGYN | Facility: CLINIC | Age: 29
End: 2024-01-09